# Patient Record
(demographics unavailable — no encounter records)

---

## 2018-01-25 NOTE — XRAY REPORT
FINAL REPORT



PROCEDURE:  XR CHEST ROUTINE 2V



TECHNIQUE:  PA and lateral chest radiographs were obtained. CPT

17299







HISTORY:  cough, fever 



COMPARISON:  No prior studies are available for comparison.



FINDINGS:  

Heart: Normal.



Mediastinum/Vessels: Normal.



Lungs/Pleural space: No infiltrate, effusion, or pneumothorax.



Bony thorax: No acute osseous abnormality.



Other: 



IMPRESSION:  

No radiographic evidence of acute abnormality.

## 2018-01-25 NOTE — EMERGENCY DEPARTMENT REPORT
Minor Respiratory





- HPI


Chief Complaint: Upper Respiratory Infection


Stated Complaint: FLU


Time Seen by Provider: 01/25/18 20:59


Duration: 3 Days


Severity: mild


Minor Respiratory: Yes Sore Throat, Yes Able to Tolerate Fluids, Yes Ear Pain, 

Yes Cough, Yes Sick Contacts, Yes Fever, No Rhinorrhea, No Hemoptysis, No Chest 

Pain, No Shortness of Breath


Other History: 19 year old male presents to ED with fever, bodyaches, cough, 

sore throat, sneezing, congestion, chills x3 days. patient states his co worker 

was diagnosed with flu. patient denies medical history.





ED Review of Systems


ROS: 


Stated complaint: FLU


Other details as noted in HPI





Constitutional: fever.  denies: chills


Eyes: denies: eye pain, eye discharge, vision change


ENT: throat pain, congestion.  denies: ear pain


Respiratory: cough.  denies: shortness of breath, wheezing


Cardiovascular: denies: chest pain, palpitations


Endocrine: no symptoms reported


Gastrointestinal: denies: abdominal pain, nausea, diarrhea


Genitourinary: denies: urgency, dysuria


Musculoskeletal: denies: back pain, joint swelling, arthralgia


Skin: denies: rash, lesions


Neurological: headache.  denies: weakness, numbness, paresthesias, confusion, 

abnormal gait, vertigo


Psychiatric: denies: anxiety, depression


Hematological/Lymphatic: denies: easy bleeding, easy bruising





ED Past Medical Hx





- Past Medical History


Previous Medical History?: No





- Surgical History


Past Surgical History?: Yes


Additional Surgical History: PE tubes





- Social History


Smoking Status: Never Smoker


Substance Use Type: None





- Medications


Home Medications: 


 Home Medications











 Medication  Instructions  Recorded  Confirmed  Last Taken  Type


 


Oseltamivir [Tamiflu] 75 mg PO BID #10 cap 01/25/18  Unknown Rx














Minor Respiratory Exam





- Exam


General: 


Vital signs noted. No distress. Alert and acting appropriately.





HEENT: Yes Moist Mucous Membranes, No Pharyngeal Erythema, No Pharyngeal 

Exudates, No Rhinorrhea, No Conjuctival Injection, No Frontal Tenderness, No 

Maxillary Tenderness


Ear: Neither TM Bulge, Neither TM Erythema, Neither EAC Pain, Neither EAC 

Discharge


Neck: Yes Supple, No Adenopathy


Lungs: Yes Good Air Exchange, Yes Cough, No Wheezes, No Ronchi, No Stridor, No 

Labored Respirations, No Retractions, No Use of Accessory Muscles, No Other 

Abnormal Lung Sounds


Heart: Yes Regular, No Murmur


Abdomen: Yes Normal Bowel Sounds, No Tenderness, No Peritoneal Signs


Skin: No Rash, No Edema


Neurologic: 


Alert and oriented, no deficits.








Musculoskeletal: 


Unremarkable.











ED Course


 Vital Signs











  01/25/18 01/25/18





  15:04 15:08


 


Temperature 102.4 F H 


 


Pulse Rate 88 


 


Respiratory 20 18





Rate  


 


Blood Pressure 119/72 


 


O2 Sat by Pulse 95 





Oximetry  














ED Medical Decision Making





- Lab Data





positive influenza swab


 Laboratory Results - last 72 hr











  01/25/18





  20:34


 


Urine Color  Yellow


 


Urine Turbidity  Clear


 


Urine pH  5.0


 


Ur Specific Gravity  1.032 H


 


Urine Protein  30 mg/dl


 


Urine Glucose (UA)  Neg


 


Urine Ketones  Neg


 


Urine Blood  Neg


 


Urine Nitrite  Neg


 


Urine Bilirubin  Neg


 


Urine Urobilinogen  4.0


 


Ur Leukocyte Esterase  Neg


 


Urine WBC (Auto)  2.0


 


Urine RBC (Auto)  < 1.0


 


U Epithel Cells (Auto)  < 1.0


 


Urine Mucus  1+











 Vital Signs (72 hours)











  01/25/18 01/25/18 01/25/18





  15:04 15:08 21:35


 


Temperature 102.4 F H  


 


Pulse Rate 88  82


 


Respiratory 20 18 18





Rate   


 


Blood Pressure 119/72  


 


Blood Pressure   130/61





[Left]   


 


O2 Sat by Pulse 95  97





Oximetry   














  01/25/18





  21:46


 


Temperature 99.2 F


 


Pulse Rate 96 H


 


Respiratory 16





Rate 


 


Blood Pressure 


 


Blood Pressure 





[Left] 


 


O2 Sat by Pulse 





Oximetry 














- Radiology Data


Radiology results: report reviewed





- Medical Decision Making





19 year old male presents to ED with fever, bodyaches, chills, coughing, 

sneezing, sore throat x 3 days. patient has positive flu swab. patient has no 

acute findings on imaging of chest and no signs of UTI in urine. patient is 

stable, neurologically intact and in no acute distress.


Critical care attestation.: 


If time is entered above; I have spent that time in minutes in the direct care 

of this critically ill patient, excluding procedure time.








ED Disposition


Clinical Impression: 


 Influenza A





Disposition: DC-01 TO HOME OR SELFCARE


Is pt being admited?: No


Does the pt Need Aspirin: No


Condition: Stable


Instructions:  Influenza (ED)


Prescriptions: 


Oseltamivir [Tamiflu] 75 mg PO BID #10 cap


Referrals: 


PRIMARY CARE,MD [Primary Care Provider] - 3-5 Days


Forms:  Work/School Release Form(ED)